# Patient Record
Sex: FEMALE | Race: WHITE | NOT HISPANIC OR LATINO | Employment: OTHER
[De-identification: names, ages, dates, MRNs, and addresses within clinical notes are randomized per-mention and may not be internally consistent; named-entity substitution may affect disease eponyms.]

---

## 2017-07-22 ENCOUNTER — HEALTH MAINTENANCE LETTER (OUTPATIENT)
Age: 32
End: 2017-07-22

## 2022-03-05 ENCOUNTER — HEALTH MAINTENANCE LETTER (OUTPATIENT)
Age: 37
End: 2022-03-05

## 2022-06-21 ENCOUNTER — HOSPITAL ENCOUNTER (EMERGENCY)
Facility: HOSPITAL | Age: 37
Discharge: HOME | End: 2022-06-21
Attending: FAMILY MEDICINE | Admitting: FAMILY MEDICINE
Payer: COMMERCIAL

## 2022-06-21 VITALS
DIASTOLIC BLOOD PRESSURE: 93 MMHG | TEMPERATURE: 98 F | RESPIRATION RATE: 18 BRPM | SYSTOLIC BLOOD PRESSURE: 143 MMHG | HEART RATE: 108 BPM | BODY MASS INDEX: 40.24 KG/M2 | WEIGHT: 220 LBS

## 2022-06-21 DIAGNOSIS — S61.512A LACERATION OF LEFT WRIST, INITIAL ENCOUNTER: ICD-10-CM

## 2022-06-21 PROCEDURE — 99283 EMERGENCY DEPT VISIT LOW MDM: CPT

## 2022-06-21 PROCEDURE — 12001 RPR S/N/AX/GEN/TRNK 2.5CM/<: CPT

## 2022-06-21 NOTE — ED TRIAGE NOTES
Laceration on the left wrist. Pt was using chisel on a stump taking the bark off and chisel slip and had laceration on the left wrist. Last tetanus shots was 2 years ago. No active bleeding noted. Applied dressing.     Triage Assessment     Row Name 06/21/22 0115       Triage Assessment (Adult)    Airway WDL WDL       Respiratory WDL    Respiratory WDL WDL       Cognitive/Neuro/Behavioral WDL    Cognitive/Neuro/Behavioral WDL WDL

## 2022-06-21 NOTE — ED PROVIDER NOTES
EMERGENCY DEPARTMENT ENCOUNTER      NAME: Kaitlin Her  AGE: 36 year old female  YOB: 1985  MRN: [id number]  EVALUATION DATE & TIME: No admission date for patient encounter.    PCP: Sabina Crespo    ED PROVIDER: Avinash Erazo M.D.    Chief Complaint   Patient presents with     Laceration       FINAL IMPRESSION:  1. Laceration of left wrist, initial encounter        ED COURSE & MEDICAL DECISION MAKING:    Pertinent Labs & Imaging studies personally reviewed and interpreted by me. (See chart for details)  1:26 AM Patient seen and examined, prior records reviewed. Laceration repair was performed. Patient tolerated the procedure well.  No evidence for tendon laceration or dysfunction.    At the conclusion of the encounter I discussed the results of all of the tests and the disposition. The questions were answered. The patient or family acknowledged understanding and was agreeable with the care plan.     PROCEDURES:   [facility]    -Laceration Repair    Date/Time: 6/21/2022 1:43 AM  Performed by: Avinash Erazo MD  Authorized by: Avinash Erazo MD     Risks, benefits and alternatives discussed.      ANESTHESIA (see MAR for exact dosages):     Anesthesia method:  Local infiltration    Local anesthetic:  Lidocaine 1% WITH epi  LACERATION DETAILS     Location:  Shoulder/arm    Shoulder/arm location:  L lower arm    Length (cm):  1.2    REPAIR TYPE:     Repair type:  Simple        TREATMENT:     Area cleansed with:  Hibiclens    Amount of cleaning:  Standard    SKIN REPAIR     Repair method:  Sutures    Suture size:  4-0    Suture material:  Chromic gut    Suture technique:  Simple interrupted    Number of sutures:  3    APPROXIMATION     Approximation:  Loose    PROCEDURE    Patient Tolerance:  Patient tolerated the procedure well with no immediate complications      MEDICATIONS GIVEN IN THE EMERGENCY:  Medications - No data to display    NEW PRESCRIPTIONS  STARTED AT TODAY'S ER VISIT  New Prescriptions    No medications on file       =================================================================    HPI    Patient information was obtained from: patient       Kaitlin Her is a healthy 36 year old female who presents to this ED via walk in for evaluation of left wrist laceration.    Patient presents with laceration on her left wrist when she using the chisel on a tree stump trying to take the bark off when it slipped onto her left wrist. Her last Tdap was 2 years ago. No active bleeding but she was concerned with the size of her laceration and came into ED without cleaning it. She does work as a  currently.       REVIEW OF SYSTEMS   Review of Systems   Musculoskeletal:        Positive laceration to left wrist   Skin:        Positive laceration to left wrist   All other systems reviewed and are negative.     All other systems reviewed and negative    PAST MEDICAL HISTORY:  History reviewed. No pertinent past medical history.    PAST SURGICAL HISTORY:  History reviewed. No pertinent surgical history.    CURRENT MEDICATIONS:    No current facility-administered medications for this encounter.     No current outpatient medications on file.       ALLERGIES:  Allergies   Allergen Reactions     No Known Drug Allergies        FAMILY HISTORY:  Family History   Problem Relation Age of Onset     Respiratory Brother         asthma     Breast Cancer Mother              Family History Negative Father         unknown     Diabetes Maternal Grandmother      Hypertension Maternal Grandmother      Heart Disease Maternal Grandmother         stents     Lipids Maternal Grandmother         chol     Family History Negative Sister        SOCIAL HISTORY:   Social History     Socioeconomic History     Marital status:        VITALS:  BP (!) 143/93   Pulse 108   Temp 98  F (36.7  C) (Tympanic)   Resp 18   Wt 99.8 kg (220 lb)   BMI 40.24 kg/m      PHYSICAL  EXAM:  Physical Exam  Vitals and nursing note reviewed.   Constitutional:       Appearance: Normal appearance.   HENT:      Head: Normocephalic and atraumatic.      Right Ear: External ear normal.      Left Ear: External ear normal.      Nose: Nose normal.      Mouth/Throat:      Mouth: Mucous membranes are moist.   Eyes:      Extraocular Movements: Extraocular movements intact.      Conjunctiva/sclera: Conjunctivae normal.      Pupils: Pupils are equal, round, and reactive to light.   Cardiovascular:      Rate and Rhythm: Normal rate and regular rhythm.   Pulmonary:      Effort: Pulmonary effort is normal.      Breath sounds: Normal breath sounds. No wheezing or rales.   Abdominal:      General: Abdomen is flat. There is no distension.      Palpations: Abdomen is soft.      Tenderness: There is no abdominal tenderness. There is no guarding.   Musculoskeletal:         General: Normal range of motion.      Cervical back: Normal range of motion and neck supple.      Right lower leg: No edema.      Left lower leg: No edema.      Comments: 12 mm straight full thickness laceration to radial wrist of left arm. Full flexion and extension of wrist MCP and PIP of joints.    Lymphadenopathy:      Cervical: No cervical adenopathy.   Skin:     General: Skin is warm and dry.   Neurological:      General: No focal deficit present.      Mental Status: She is alert and oriented to person, place, and time. Mental status is at baseline.      Comments: No gross focal neurologic deficits   Psychiatric:         Mood and Affect: Mood normal.         Behavior: Behavior normal.         Thought Content: Thought content normal.        I, Mirian Zambrano, am serving as a scribe to document services personally performed by Dr. Erazo based on my observation and the provider's statements to me. I, Avinash Erazo MD attest that Mirian Zambrano is acting in a scribe capacity, has observed my performance of the services and has documented them in  accordance with my direction.    Avinash Erazo M.D.  Emergency Medicine  [facility] EMERGENCY DEPARTMENT  [facility] [facility] [id number] [phone]  Dept: [phone]     Avinash Erazo MD  06/21/22 8862

## 2022-06-21 NOTE — DISCHARGE INSTRUCTIONS
The sutures are absorbable and will fall out on their own, or you can have them removed by your primary care doctor in 10 to 14 days

## 2022-11-20 ENCOUNTER — HEALTH MAINTENANCE LETTER (OUTPATIENT)
Age: 37
End: 2022-11-20

## 2023-04-15 ENCOUNTER — HEALTH MAINTENANCE LETTER (OUTPATIENT)
Age: 38
End: 2023-04-15

## 2024-02-21 ENCOUNTER — APPOINTMENT (OUTPATIENT)
Dept: ULTRASOUND IMAGING | Facility: HOSPITAL | Age: 39
End: 2024-02-21
Attending: EMERGENCY MEDICINE
Payer: COMMERCIAL

## 2024-02-21 ENCOUNTER — APPOINTMENT (OUTPATIENT)
Dept: CT IMAGING | Facility: HOSPITAL | Age: 39
End: 2024-02-21
Attending: EMERGENCY MEDICINE
Payer: COMMERCIAL

## 2024-02-21 ENCOUNTER — HOSPITAL ENCOUNTER (EMERGENCY)
Facility: HOSPITAL | Age: 39
Discharge: HOME | End: 2024-02-21
Attending: EMERGENCY MEDICINE | Admitting: EMERGENCY MEDICINE
Payer: COMMERCIAL

## 2024-02-21 VITALS
SYSTOLIC BLOOD PRESSURE: 154 MMHG | WEIGHT: 253 LBS | OXYGEN SATURATION: 99 % | RESPIRATION RATE: 16 BRPM | BODY MASS INDEX: 46.56 KG/M2 | DIASTOLIC BLOOD PRESSURE: 79 MMHG | HEART RATE: 92 BPM | TEMPERATURE: 98.4 F | HEIGHT: 62 IN

## 2024-02-21 DIAGNOSIS — N93.9 VAGINAL SPOTTING: ICD-10-CM

## 2024-02-21 DIAGNOSIS — K80.50 BILIARY COLIC SYMPTOM: ICD-10-CM

## 2024-02-21 LAB
ALBUMIN SERPL BCG-MCNC: 3.8 G/DL (ref 3.5–5.2)
ALBUMIN UR-MCNC: NEGATIVE MG/DL
ALP SERPL-CCNC: 84 U/L (ref 40–150)
ALT SERPL W P-5'-P-CCNC: 54 U/L (ref 0–50)
ANION GAP SERPL CALCULATED.3IONS-SCNC: 9 MMOL/L (ref 7–15)
APPEARANCE UR: CLEAR
AST SERPL W P-5'-P-CCNC: 28 U/L (ref 0–45)
BACTERIA #/AREA URNS HPF: ABNORMAL /HPF
BASOPHILS # BLD AUTO: 0 10E3/UL (ref 0–0.2)
BASOPHILS NFR BLD AUTO: 1 %
BILIRUB DIRECT SERPL-MCNC: <0.2 MG/DL (ref 0–0.3)
BILIRUB SERPL-MCNC: <0.2 MG/DL
BILIRUB UR QL STRIP: NEGATIVE
BUN SERPL-MCNC: 15.8 MG/DL (ref 6–20)
CALCIUM SERPL-MCNC: 8.5 MG/DL (ref 8.6–10)
CHLORIDE SERPL-SCNC: 102 MMOL/L (ref 98–107)
COLOR UR AUTO: ABNORMAL
CREAT SERPL-MCNC: 0.88 MG/DL (ref 0.51–0.95)
DEPRECATED HCO3 PLAS-SCNC: 25 MMOL/L (ref 22–29)
EGFRCR SERPLBLD CKD-EPI 2021: 86 ML/MIN/1.73M2
EOSINOPHIL # BLD AUTO: 0.1 10E3/UL (ref 0–0.7)
EOSINOPHIL NFR BLD AUTO: 1 %
ERYTHROCYTE [DISTWIDTH] IN BLOOD BY AUTOMATED COUNT: 12.6 % (ref 10–15)
GLUCOSE SERPL-MCNC: 98 MG/DL (ref 70–99)
GLUCOSE UR STRIP-MCNC: NEGATIVE MG/DL
HCG SERPL QL: NEGATIVE
HCT VFR BLD AUTO: 38.9 % (ref 35–47)
HGB BLD-MCNC: 13.1 G/DL (ref 11.7–15.7)
HGB UR QL STRIP: NEGATIVE
HOLD SPECIMEN: NORMAL
IMM GRANULOCYTES # BLD: 0 10E3/UL
IMM GRANULOCYTES NFR BLD: 0 %
KETONES UR STRIP-MCNC: NEGATIVE MG/DL
LEUKOCYTE ESTERASE UR QL STRIP: NEGATIVE
LIPASE SERPL-CCNC: 31 U/L (ref 13–60)
LYMPHOCYTES # BLD AUTO: 2.5 10E3/UL (ref 0.8–5.3)
LYMPHOCYTES NFR BLD AUTO: 35 %
MCH RBC QN AUTO: 29.2 PG (ref 26.5–33)
MCHC RBC AUTO-ENTMCNC: 33.7 G/DL (ref 31.5–36.5)
MCV RBC AUTO: 87 FL (ref 78–100)
MONOCYTES # BLD AUTO: 0.7 10E3/UL (ref 0–1.3)
MONOCYTES NFR BLD AUTO: 10 %
MUCOUS THREADS #/AREA URNS LPF: PRESENT /LPF
NEUTROPHILS # BLD AUTO: 3.9 10E3/UL (ref 1.6–8.3)
NEUTROPHILS NFR BLD AUTO: 53 %
NITRATE UR QL: NEGATIVE
NRBC # BLD AUTO: 0 10E3/UL
NRBC BLD AUTO-RTO: 0 /100
PH UR STRIP: 6.5 [PH] (ref 5–7)
PLATELET # BLD AUTO: 315 10E3/UL (ref 150–450)
POTASSIUM SERPL-SCNC: 4 MMOL/L (ref 3.4–5.3)
PROT SERPL-MCNC: 6.8 G/DL (ref 6.4–8.3)
RBC # BLD AUTO: 4.48 10E6/UL (ref 3.8–5.2)
RBC URINE: 1 /HPF
SODIUM SERPL-SCNC: 136 MMOL/L (ref 135–145)
SP GR UR STRIP: 1.03 (ref 1–1.03)
SQUAMOUS EPITHELIAL: 5 /HPF
UROBILINOGEN UR STRIP-MCNC: <2 MG/DL
WBC # BLD AUTO: 7.3 10E3/UL (ref 4–11)
WBC URINE: 2 /HPF

## 2024-02-21 PROCEDURE — 250N000011 HC RX IP 250 OP 636: Performed by: EMERGENCY MEDICINE

## 2024-02-21 PROCEDURE — 83690 ASSAY OF LIPASE: CPT | Performed by: EMERGENCY MEDICINE

## 2024-02-21 PROCEDURE — 258N000003 HC RX IP 258 OP 636: Performed by: EMERGENCY MEDICINE

## 2024-02-21 PROCEDURE — 99285 EMERGENCY DEPT VISIT HI MDM: CPT | Mod: 25

## 2024-02-21 PROCEDURE — C9113 INJ PANTOPRAZOLE SODIUM, VIA: HCPCS | Performed by: EMERGENCY MEDICINE

## 2024-02-21 PROCEDURE — 81001 URINALYSIS AUTO W/SCOPE: CPT | Performed by: EMERGENCY MEDICINE

## 2024-02-21 PROCEDURE — 250N000013 HC RX MED GY IP 250 OP 250 PS 637: Performed by: EMERGENCY MEDICINE

## 2024-02-21 PROCEDURE — 80053 COMPREHEN METABOLIC PANEL: CPT | Performed by: EMERGENCY MEDICINE

## 2024-02-21 PROCEDURE — 96374 THER/PROPH/DIAG INJ IV PUSH: CPT | Mod: 59

## 2024-02-21 PROCEDURE — 250N000009 HC RX 250: Performed by: EMERGENCY MEDICINE

## 2024-02-21 PROCEDURE — 85025 COMPLETE CBC W/AUTO DIFF WBC: CPT | Performed by: EMERGENCY MEDICINE

## 2024-02-21 PROCEDURE — 76705 ECHO EXAM OF ABDOMEN: CPT

## 2024-02-21 PROCEDURE — 84703 CHORIONIC GONADOTROPIN ASSAY: CPT | Performed by: EMERGENCY MEDICINE

## 2024-02-21 PROCEDURE — 36415 COLL VENOUS BLD VENIPUNCTURE: CPT | Performed by: EMERGENCY MEDICINE

## 2024-02-21 PROCEDURE — 96361 HYDRATE IV INFUSION ADD-ON: CPT

## 2024-02-21 PROCEDURE — 96375 TX/PRO/DX INJ NEW DRUG ADDON: CPT

## 2024-02-21 PROCEDURE — 74177 CT ABD & PELVIS W/CONTRAST: CPT

## 2024-02-21 RX ORDER — ACETAMINOPHEN 500 MG
500 TABLET ORAL EVERY 4 HOURS PRN
Qty: 60 TABLET | Refills: 0 | Status: SHIPPED | OUTPATIENT
Start: 2024-02-21 | End: 2024-07-18

## 2024-02-21 RX ORDER — IOPAMIDOL 755 MG/ML
90 INJECTION, SOLUTION INTRAVASCULAR ONCE
Status: COMPLETED | OUTPATIENT
Start: 2024-02-21 | End: 2024-02-21

## 2024-02-21 RX ORDER — ONDANSETRON 2 MG/ML
4 INJECTION INTRAMUSCULAR; INTRAVENOUS ONCE
Status: COMPLETED | OUTPATIENT
Start: 2024-02-21 | End: 2024-02-21

## 2024-02-21 RX ORDER — LIDOCAINE HYDROCHLORIDE 20 MG/ML
15 SOLUTION OROPHARYNGEAL ONCE
Status: COMPLETED | OUTPATIENT
Start: 2024-02-21 | End: 2024-02-21

## 2024-02-21 RX ORDER — MAGNESIUM HYDROXIDE/ALUMINUM HYDROXICE/SIMETHICONE 120; 1200; 1200 MG/30ML; MG/30ML; MG/30ML
15 SUSPENSION ORAL ONCE
Status: COMPLETED | OUTPATIENT
Start: 2024-02-21 | End: 2024-02-21

## 2024-02-21 RX ORDER — OXYCODONE HYDROCHLORIDE 5 MG/1
5 TABLET ORAL EVERY 6 HOURS PRN
Qty: 10 TABLET | Refills: 0 | Status: SHIPPED | OUTPATIENT
Start: 2024-02-21 | End: 2024-02-24

## 2024-02-21 RX ADMIN — SODIUM CHLORIDE 1000 ML: 9 INJECTION, SOLUTION INTRAVENOUS at 03:17

## 2024-02-21 RX ADMIN — PANTOPRAZOLE SODIUM 40 MG: 40 INJECTION, POWDER, FOR SOLUTION INTRAVENOUS at 03:14

## 2024-02-21 RX ADMIN — IOPAMIDOL 90 ML: 755 INJECTION, SOLUTION INTRAVENOUS at 04:13

## 2024-02-21 RX ADMIN — ALUMINUM HYDROXIDE, MAGNESIUM HYDROXIDE, AND DIMETHICONE 15 ML: 200; 20; 200 SUSPENSION ORAL at 03:11

## 2024-02-21 RX ADMIN — ONDANSETRON 4 MG: 2 INJECTION INTRAMUSCULAR; INTRAVENOUS at 03:12

## 2024-02-21 RX ADMIN — LIDOCAINE HYDROCHLORIDE 15 ML: 20 SOLUTION ORAL; TOPICAL at 03:11

## 2024-02-21 ASSESSMENT — ENCOUNTER SYMPTOMS
SHORTNESS OF BREATH: 0
DYSURIA: 0
NAUSEA: 1
FEVER: 0
ABDOMINAL PAIN: 1
VOMITING: 0
DIARRHEA: 0
COUGH: 1

## 2024-02-21 ASSESSMENT — ACTIVITIES OF DAILY LIVING (ADL)
ADLS_ACUITY_SCORE: 35
ADLS_ACUITY_SCORE: 35

## 2024-02-21 NOTE — ED TRIAGE NOTES
Reports abd pain for past 3-4 days. Denies V/D reports mild nausea. Also reports vaginal bleeding. States she had her period about 2 weeks ago.      Triage Assessment (Adult)       Row Name 02/21/24 0202          Triage Assessment    Airway WDL WDL        Respiratory WDL    Respiratory WDL WDL        Skin Circulation/Temperature WDL    Skin Circulation/Temperature WDL WDL        Cardiac WDL    Cardiac WDL WDL

## 2024-02-21 NOTE — DISCHARGE INSTRUCTIONS
Your gallbladder has multiple stones in it.  This is likely contributing to your pain.    Call and make an appointment to follow-up in the general surgery clinic for gallbladder pain.    You can use the oxycodone if needed for pain, but I would try this after you have tried Tylenol 500 mg.  If tylenol is not providing adequate pain control then you can use the oxycodone.  I recommend that you try to avoid ibuprofen, aspirin, naproxen type products in preparation for possible surgery next week.    Try to avoid any fatty, greasy, or heavy meals.  Consider even a clear liquid diet for the next few days to help calm your gallbladder down and this may allow you to control your pain enough to have surgery next week with the general surgeon.    Make an appointment to follow-up with primary care or OB/GYN to discuss your vaginal spotting and your changes in your menstrual cycles.    Return to the emergency department if you develop fever, worsening pain, persistent vomiting, or any other concerns.    Thank you for choosing [facility] Emergency Department.  It has been my pleasure caring for you today.     ~Dr. Lori MD

## 2024-02-21 NOTE — ED PROVIDER NOTES
EMERGENCY DEPARTMENT ENCOUNTER      NAME: Kaitlin Her  AGE: 38 year old female  YOB: 1985  MRN: [id number]  EVALUATION DATE & TIME: 2024  2:05 AM    PCP: Sabina Crespo (Inactive)    ED PROVIDER: Natalie Sandoval M.D.        Chief Complaint   Patient presents with    Abdominal Pain         FINAL IMPRESSION:    1. Biliary colic symptom    2. Vaginal spotting        MEDICAL DECISION MAKIN year old female history of obesity and presents emergency department with abdominal pain.  She states it has been pretty diffuse for the past 3 to 4 days but is now more localized in the epigastric region.  She has had some nausea without vomiting or diarrhea.  She also reported some vaginal spotting with her last normal menstrual cycle about 2 weeks ago.  Laboratories all unremarkable.  Imaging by CT scan suggest possible biliary colic.  Ultrasound done which shows multiple stones but no common bile duct or biliary dilatation.  No signs for acute cholecystitis.  After discussion with patient and general surgery the decision has been made for patient to go home and follow-up general surgery as an outpatient for consideration for outpatient surgical management.  Patient feels comfortable with the plan.  She states her pain is controlled at this time and feels comfortable with a prescription for pain medicine for home use.  She understands what to watch for and when to return to the ER and all of her questions have been answered.    She has had some vaginal spotting.  Do not think is contributing to her symptoms at this time.  She denies any concerns for STDs.  I do not think this represents PID, TOA, etc.  Hemodynamically stable.  Hemoglobin stable.  Feel she can follow-up with OB/GYN as an outpatient as needed with regards to her vaginal spotting. I do not think she requires an emergent pelvic ultrasound.      ED COURSE:  2:35 AM  I met with the patient to gather history and perform my  exam. ED course and treatment discussed.    4:47 AM  Dated patient on CT results and plan to do ultrasound.  She agrees with that plan.  She states her pain is well-controlled at this time and declines any further pain medication.    6:15 AM  Gallbladder full of gallstones without obvious cholecystitis.  Laboratories reassuring.  She continues to complain of some abdominal pain in this area but it is not worsening.  Is not going away.  She prefers to avoid any pain medication at this time if possible.  Will discuss with general surgery.  Patient states that she will be agreeable to what ever surgery recommends whether it be surgery today or outpatient follow-up.  She last had pulled pork and some soda and apple juice around midnight.    6:22 AM  Spoke with Dr. Gramajo guarding symptomatic biliary colic.  Unfortunate there is no OR time in the near future, possibly 12 hours out from now.  He states the patient is welcome to stay to have surgery at that time if she wishes otherwise he be happy to see her in clinic and arrange outpatient surgical intervention.    6:28 AM  Spoke with the patient.  Explained the risks and benefits of surgery today versus follow-up with general surgery and outpatient management.  She would like to go home which I think is absolutely reasonable.  No signs for acute cholecystitis at this time.  Nothing to suggest ascending cholangitis or other infectious process.  Will send her home with a few pills for pain control, spoke with her about modifying her diet in the short-term, and will make a referral to general surgery.  She states she does not need a work note at this time.  She feels comfortable with this plan.  She understands what to watch for and when to return to the ER and all of her questions have been answered.    I do not think that this represents ACS, PE, ruptured AAA, aortic dissection, bowel obstruction, bowel ischemia, pancreatitis, appendicitis, diverticulitis, kidney stone,  "pyelonephritis, incarcerated or strangulated hernia, ovarian torsion, PID, ectopic pregnancy, tubo-ovarian abscess, viscus perforation, perforated GI ulcer, or other such etiologies at this time.    At the conclusion of the encounter I discussed the results of all of the tests and the disposition. Their questions were answered. The patient (and any family present) acknowledged understanding and were agreeable with the care plan.      CONSULTANTS:  General surgery - Dr. Gramajo        MEDICATIONS GIVEN IN THE EMERGENCY:  Medications   sodium chloride 0.9% BOLUS 1,000 mL ( Intravenous Rate/Dose Verify 2/21/24 0631)   ondansetron (ZOFRAN) injection 4 mg (4 mg Intravenous $Given 2/21/24 0312)   alum & mag hydroxide-simethicone (MAALOX) suspension 15 mL (15 mLs Oral $Given 2/21/24 0311)   lidocaine (viscous) (XYLOCAINE) 2 % solution 15 mL (15 mLs Mouth/Throat $Given 2/21/24 0311)   pantoprazole (PROTONIX) IV push injection 40 mg (40 mg Intravenous $Given 2/21/24 0314)   iopamidol (ISOVUE-370) solution 90 mL (90 mLs Intravenous $Given 2/21/24 3203)           NEW PRESCRIPTIONS STARTED AT TODAY'S ER VISIT     Medication List        Started      acetaminophen 500 MG tablet  Commonly known as: TYLENOL  500 mg, Oral, EVERY 4 HOURS PRN     oxyCODONE 5 MG tablet  Commonly known as: ROXICODONE  5 mg, Oral, EVERY 6 HOURS PRN                  CONDITION:  stable        DISPOSITION:  D.c home         =================================================================  =================================================================  TRIAGE ASSESSMENT:  Reports abd pain for past 3-4 days. Denies V/D reports mild nausea. Also reports vaginal bleeding. States she had her period about 2 weeks ago.     ED Triage Vitals [02/21/24 0201]   Enc Vitals Group      BP (!) 181/92      Pulse 110      Resp 18      Temp 98.4  F (36.9  C)      Temp src Temporal      SpO2 99 %      Weight 114.8 kg (253 lb)      Height 1.575 m (5' 2\")    "       ================================================================  ================================================================    HPI    Patient information was obtained from: patient    Use of Intrepreter: N/A      Kaitlin Her is a 38 year old female with history of obesity who presents to the ER with complaints of abdominal pain.  She has been having this pain for the past 3 to 4 days.  States it is pretty diffuse in nature, but today seems to be more focal in the epigastric region.  She has had some nausea without vomiting or diarrhea.  She had her normal menstrual cycle about 2 weeks ago and does note that she is having more abdominal discomfort in general with her menstrual cycles.  Today she did note some vaginal spotting.  Denies any other vaginal discharge or concerns for STDs.  Denies any suprapubic discomfort.  Otherwise denies fevers, chest pain, shortness of breath.  She has had a little bit of a cough lately but does not feel this is significant.      CHART REVIEW:  Most recent notes in our computer system are from June 2022 and are not pertinent to today's visit.  She does not have any older imaging in our system or care everywhere.      REVIEW OF SYSTEMS  Review of Systems   Constitutional:  Negative for fever.   Respiratory:  Positive for cough. Negative for shortness of breath.    Cardiovascular:  Negative for chest pain.   Gastrointestinal:  Positive for abdominal pain and nausea. Negative for diarrhea and vomiting.   Genitourinary:  Positive for vaginal bleeding. Negative for dysuria, vaginal discharge and vaginal pain.   All other systems reviewed and are negative.        PAST MEDICAL HISTORY:  History reviewed. No pertinent past medical history.      PAST SURGICAL HISTORY:  History reviewed. No pertinent surgical history.      CURRENT MEDICATIONS:    Prior to Admission medications    Not on File         ALLERGIES:  Allergies   Allergen Reactions    No Known Drug Allergy   "        FAMILY HISTORY:  Family History   Problem Relation Age of Onset    Respiratory Brother         asthma    Breast Cancer Mother             Family History Negative Father         unknown    Diabetes Maternal Grandmother     Hypertension Maternal Grandmother     Heart Disease Maternal Grandmother         stents    Lipids Maternal Grandmother         chol    Family History Negative Sister          SOCIAL HISTORY:  Social History     Socioeconomic History    Marital status:          VITALS:  Patient Vitals for the past 24 hrs:   BP Temp Temp src Pulse Resp SpO2 Height Weight   24 0616 (!) 173/95 -- -- 81 16 100 % -- --   24 0600 (!) 167/95 -- -- -- -- -- -- --   24 0545 (!) 155/98 -- -- -- -- -- -- --   24 0345 (!) 155/89 -- -- 80 -- 99 % -- --   24 0330 (!) 148/89 -- -- 79 -- 98 % -- --   24 0315 135/82 -- -- -- -- -- -- --   24 0230 (!) 149/89 -- -- 101 -- 98 % -- --   24 0201 (!) 181/92 98.4  F (36.9  C) Temporal 110 18 99 % 1.575 m (5' 2\") 114.8 kg (253 lb)       Wt Readings from Last 3 Encounters:   24 114.8 kg (253 lb)   22 99.8 kg (220 lb)   20 90.7 kg (200 lb)       Estimated Creatinine Clearance: 105 mL/min (based on SCr of 0.88 mg/dL).    PHYSICAL EXAM    Constitutional:  Well developed, Well nourished, NAD  HENT:  Normocephalic, Atraumatic, Bilateral external ears normal, Nose normal. Neck- Supple, No stridor.   Eyes:  PERRL, EOMI, Conjunctiva normal, No discharge.  Respiratory:  Normal breath sounds, No respiratory distress, No wheezing, Speaks full sentences easily. No cough.   Cardiovascular:  Normal heart rate, Regular rhythm, No rubs, No gallops. Chest wall nontender.   GI:  +obesity.  Bowel sounds normal, Soft, +mild epigastric tenderness, No masses, No flank tenderness. No rebound or guarding.   : deferred  Musculoskeletal: No cyanosis, No clubbing. Good range of motion in all major joints. No major deformities " noted.   Integument:  Warm, Dry, No erythema, No rash.  No petechiae.   Neurologic:  Alert & oriented x 3  Psychiatric:  Affect normal, Cooperative         LAB:  All pertinent labs reviewed and interpreted.  Recent Results (from the past 24 hour(s))   UA with Microscopic reflex to Culture    Collection Time: 02/21/24  2:44 AM    Specimen: Urine, Catheter   Result Value Ref Range    Color Urine Light Yellow Colorless, Straw, Light Yellow, Yellow    Appearance Urine Clear Clear    Glucose Urine Negative Negative mg/dL    Bilirubin Urine Negative Negative    Ketones Urine Negative Negative mg/dL    Specific Gravity Urine 1.030 1.001 - 1.030    Blood Urine Negative Negative    pH Urine 6.5 5.0 - 7.0    Protein Albumin Urine Negative Negative mg/dL    Urobilinogen Urine <2.0 <2.0 mg/dL    Nitrite Urine Negative Negative    Leukocyte Esterase Urine Negative Negative    Bacteria Urine None Seen None Seen /HPF    Mucus Urine Present (A) None Seen /LPF    RBC Urine 1 <=2 /HPF    WBC Urine 2 <=5 /HPF    Squamous Epithelials Urine 5 (H) <=1 /HPF   Basic metabolic panel    Collection Time: 02/21/24  3:07 AM   Result Value Ref Range    Sodium 136 135 - 145 mmol/L    Potassium 4.0 3.4 - 5.3 mmol/L    Chloride 102 98 - 107 mmol/L    Carbon Dioxide (CO2) 25 22 - 29 mmol/L    Anion Gap 9 7 - 15 mmol/L    Urea Nitrogen 15.8 6.0 - 20.0 mg/dL    Creatinine 0.88 0.51 - 0.95 mg/dL    GFR Estimate 86 >60 mL/min/1.73m2    Calcium 8.5 (L) 8.6 - 10.0 mg/dL    Glucose 98 70 - 99 mg/dL   HCG QUALitative pregnancy (blood)    Collection Time: 02/21/24  3:07 AM   Result Value Ref Range    hCG Serum Qualitative Negative Negative   Hepatic function panel    Collection Time: 02/21/24  3:07 AM   Result Value Ref Range    Protein Total 6.8 6.4 - 8.3 g/dL    Albumin 3.8 3.5 - 5.2 g/dL    Bilirubin Total <0.2 <=1.2 mg/dL    Alkaline Phosphatase 84 40 - 150 U/L    AST 28 0 - 45 U/L    ALT 54 (H) 0 - 50 U/L    Bilirubin Direct <0.20 0.00 - 0.30 mg/dL  "  Lipase    Collection Time: 02/21/24  3:07 AM   Result Value Ref Range    Lipase 31 13 - 60 U/L   Extra Blue Top Tube    Collection Time: 02/21/24  3:07 AM   Result Value Ref Range    Hold Specimen JIC    Extra Red Top Tube    Collection Time: 02/21/24  3:07 AM   Result Value Ref Range    Hold Specimen JIC    Extra Green Top (Lithium Heparin) Tube    Collection Time: 02/21/24  3:07 AM   Result Value Ref Range    Hold Specimen JIC    Extra Purple Top Tube    Collection Time: 02/21/24  3:07 AM   Result Value Ref Range    Hold Specimen JIC    Extra Blood Bank Purple Top Tube    Collection Time: 02/21/24  3:07 AM   Result Value Ref Range    Hold Specimen JIC    CBC with platelets and differential    Collection Time: 02/21/24  3:07 AM   Result Value Ref Range    WBC Count 7.3 4.0 - 11.0 10e3/uL    RBC Count 4.48 3.80 - 5.20 10e6/uL    Hemoglobin 13.1 11.7 - 15.7 g/dL    Hematocrit 38.9 35.0 - 47.0 %    MCV 87 78 - 100 fL    MCH 29.2 26.5 - 33.0 pg    MCHC 33.7 31.5 - 36.5 g/dL    RDW 12.6 10.0 - 15.0 %    Platelet Count 315 150 - 450 10e3/uL    % Neutrophils 53 %    % Lymphocytes 35 %    % Monocytes 10 %    % Eosinophils 1 %    % Basophils 1 %    % Immature Granulocytes 0 %    NRBCs per 100 WBC 0 <1 /100    Absolute Neutrophils 3.9 1.6 - 8.3 10e3/uL    Absolute Lymphocytes 2.5 0.8 - 5.3 10e3/uL    Absolute Monocytes 0.7 0.0 - 1.3 10e3/uL    Absolute Eosinophils 0.1 0.0 - 0.7 10e3/uL    Absolute Basophils 0.0 0.0 - 0.2 10e3/uL    Absolute Immature Granulocytes 0.0 <=0.4 10e3/uL    Absolute NRBCs 0.0 10e3/uL       No results found for: \"ABORH\"        RADIOLOGY:  Reviewed all pertinent imaging. Please see official radiology report.    US Abdomen Limited   Final Result   IMPRESSION:   1.  Distended gallbladder with gallstones.            CT Abdomen Pelvis w Contrast   Final Result   IMPRESSION:    1.  Distended gallbladder with gallstones including a stone which appears lodged in the gallbladder neck. Consider ultrasound " for further evaluation.      2.  Right basilar pulmonary nodules measuring up to 8 mm. Recommend dedicated chest CT in follow-up when clinically feasible.            EKG:    none      PROCEDURES:  none    Medical Decision Making  Obtained supplemental history:Supplemental history obtained?: No  Reviewed external records: External records reviewed?: No  Care impacted by chronic illness:N/A  Care significantly affected by social determinants of health:Access to Medical Care  Did you consider but not order tests?: Work up considered but not performed and documented in chart, if applicable  Did you interpret images independently?: Independent interpretation of ECG and images noted in documentation, when applicable.  Consultation discussion with other provider:Did you involve another provider (consultant, , pharmacy, etc.)?: I discussed the care with another health care provider, see documentation for details.  Discharge. I prescribed additional prescription strength medication(s) as charted. I considered admission, but ultimately discharged patient after discussion with general surgery and shared decision with the patient.      Natalie Sandoval M.D. [facility]  Emergency Medicine and Medical Toxicology  Formerly [facility] EMERGENCY DEPARTMENT  [facility] [facility] [id number] [phone]  Dept: [phone]           Natalie Sandoval MD  02/21/24 0651

## 2024-02-26 ENCOUNTER — LAB REQUISITION (OUTPATIENT)
Dept: LAB | Facility: CLINIC | Age: 39
End: 2024-02-26
Payer: COMMERCIAL

## 2024-02-26 DIAGNOSIS — Z13.1 ENCOUNTER FOR SCREENING FOR DIABETES MELLITUS: ICD-10-CM

## 2024-02-26 DIAGNOSIS — Z13.220 ENCOUNTER FOR SCREENING FOR LIPOID DISORDERS: ICD-10-CM

## 2024-02-26 DIAGNOSIS — Z12.4 ENCOUNTER FOR SCREENING FOR MALIGNANT NEOPLASM OF CERVIX: ICD-10-CM

## 2024-02-26 DIAGNOSIS — Z11.3 ENCOUNTER FOR SCREENING FOR INFECTIONS WITH A PREDOMINANTLY SEXUAL MODE OF TRANSMISSION: ICD-10-CM

## 2024-02-26 DIAGNOSIS — N92.6 IRREGULAR MENSTRUATION, UNSPECIFIED: ICD-10-CM

## 2024-02-26 DIAGNOSIS — Z13.0 ENCOUNTER FOR SCREENING FOR DISEASES OF THE BLOOD AND BLOOD-FORMING ORGANS AND CERTAIN DISORDERS INVOLVING THE IMMUNE MECHANISM: ICD-10-CM

## 2024-02-26 PROCEDURE — 87491 CHLMYD TRACH DNA AMP PROBE: CPT | Mod: ORL | Performed by: OBSTETRICS & GYNECOLOGY

## 2024-02-26 PROCEDURE — 83498 ASY HYDROXYPROGESTERONE 17-D: CPT | Mod: ORL | Performed by: OBSTETRICS & GYNECOLOGY

## 2024-02-26 PROCEDURE — 80061 LIPID PANEL: CPT | Mod: ORL | Performed by: OBSTETRICS & GYNECOLOGY

## 2024-02-26 PROCEDURE — 85018 HEMOGLOBIN: CPT | Mod: ORL | Performed by: OBSTETRICS & GYNECOLOGY

## 2024-02-26 PROCEDURE — 83036 HEMOGLOBIN GLYCOSYLATED A1C: CPT | Mod: ORL | Performed by: OBSTETRICS & GYNECOLOGY

## 2024-02-26 PROCEDURE — 84443 ASSAY THYROID STIM HORMONE: CPT | Mod: ORL | Performed by: OBSTETRICS & GYNECOLOGY

## 2024-02-26 PROCEDURE — 84403 ASSAY OF TOTAL TESTOSTERONE: CPT | Mod: ORL | Performed by: OBSTETRICS & GYNECOLOGY

## 2024-02-26 PROCEDURE — G0145 SCR C/V CYTO,THINLAYER,RESCR: HCPCS | Mod: ORL | Performed by: OBSTETRICS & GYNECOLOGY

## 2024-02-26 PROCEDURE — 84146 ASSAY OF PROLACTIN: CPT | Mod: ORL | Performed by: OBSTETRICS & GYNECOLOGY

## 2024-02-26 PROCEDURE — 82627 DEHYDROEPIANDROSTERONE: CPT | Mod: ORL | Performed by: OBSTETRICS & GYNECOLOGY

## 2024-02-26 PROCEDURE — 83001 ASSAY OF GONADOTROPIN (FSH): CPT | Mod: ORL | Performed by: OBSTETRICS & GYNECOLOGY

## 2024-02-27 LAB
C TRACH DNA SPEC QL PROBE+SIG AMP: NEGATIVE
CHOLEST SERPL-MCNC: 189 MG/DL
DHEA-S SERPL-MCNC: 190 UG/DL (ref 35–430)
FASTING STATUS PATIENT QL REPORTED: NO
FSH SERPL IRP2-ACNC: 1.9 MIU/ML
HBA1C MFR BLD: 5.7 %
HDLC SERPL-MCNC: 42 MG/DL
HGB BLD-MCNC: 14.2 G/DL (ref 11.7–15.7)
LDLC SERPL CALC-MCNC: 128 MG/DL
N GONORRHOEA DNA SPEC QL NAA+PROBE: NEGATIVE
NONHDLC SERPL-MCNC: 147 MG/DL
PROLACTIN SERPL 3RD IS-MCNC: 12 NG/ML (ref 5–23)
TRIGL SERPL-MCNC: 95 MG/DL
TSH SERPL DL<=0.005 MIU/L-ACNC: 1.72 UIU/ML (ref 0.3–4.2)

## 2024-02-28 LAB — TESTOST SERPL-MCNC: 20 NG/DL (ref 8–60)

## 2024-02-29 LAB — 17OHP SERPL-MCNC: 23 NG/DL

## 2024-03-01 LAB
BKR LAB AP GYN ADEQUACY: NORMAL
BKR LAB AP GYN INTERPRETATION: NORMAL
BKR LAB AP HPV REFLEX: NORMAL
BKR LAB AP LMP: NORMAL
BKR LAB AP PREVIOUS ABNL DX: NORMAL
BKR LAB AP PREVIOUS ABNORMAL: NORMAL
PATH REPORT.COMMENTS IMP SPEC: NORMAL
PATH REPORT.COMMENTS IMP SPEC: NORMAL
PATH REPORT.RELEVANT HX SPEC: NORMAL

## 2024-06-09 ENCOUNTER — HOSPITAL ENCOUNTER (EMERGENCY)
Facility: HOSPITAL | Age: 39
Discharge: HOME | End: 2024-06-09
Attending: EMERGENCY MEDICINE | Admitting: EMERGENCY MEDICINE
Payer: COMMERCIAL

## 2024-06-09 ENCOUNTER — APPOINTMENT (OUTPATIENT)
Dept: CT IMAGING | Facility: HOSPITAL | Age: 39
End: 2024-06-09
Attending: EMERGENCY MEDICINE
Payer: COMMERCIAL

## 2024-06-09 VITALS
OXYGEN SATURATION: 99 % | TEMPERATURE: 97.9 F | BODY MASS INDEX: 46.06 KG/M2 | WEIGHT: 250.3 LBS | RESPIRATION RATE: 16 BRPM | HEIGHT: 62 IN | DIASTOLIC BLOOD PRESSURE: 87 MMHG | HEART RATE: 96 BPM | SYSTOLIC BLOOD PRESSURE: 149 MMHG

## 2024-06-09 DIAGNOSIS — J32.9 SINUSITIS, UNSPECIFIED CHRONICITY, UNSPECIFIED LOCATION: ICD-10-CM

## 2024-06-09 LAB — HCG UR QL: NEGATIVE

## 2024-06-09 PROCEDURE — 86334 IMMUNOFIX E-PHORESIS SERUM: CPT | Performed by: EMERGENCY MEDICINE

## 2024-06-09 PROCEDURE — 70450 CT HEAD/BRAIN W/O DYE: CPT

## 2024-06-09 PROCEDURE — 99285 EMERGENCY DEPT VISIT HI MDM: CPT | Mod: 25

## 2024-06-09 PROCEDURE — 81025 URINE PREGNANCY TEST: CPT | Performed by: EMERGENCY MEDICINE

## 2024-06-09 PROCEDURE — 86334 IMMUNOFIX E-PHORESIS SERUM: CPT | Mod: 26 | Performed by: PATHOLOGY

## 2024-06-09 PROCEDURE — 70486 CT MAXILLOFACIAL W/O DYE: CPT

## 2024-06-09 PROCEDURE — 84165 PROTEIN E-PHORESIS SERUM: CPT | Mod: 26 | Performed by: PATHOLOGY

## 2024-06-09 ASSESSMENT — ENCOUNTER SYMPTOMS
HEADACHES: 1
SINUS PAIN: 0
RHINORRHEA: 1
SINUS PRESSURE: 0

## 2024-06-09 ASSESSMENT — COLUMBIA-SUICIDE SEVERITY RATING SCALE - C-SSRS
2. HAVE YOU ACTUALLY HAD ANY THOUGHTS OF KILLING YOURSELF IN THE PAST MONTH?: NO
1. IN THE PAST MONTH, HAVE YOU WISHED YOU WERE DEAD OR WISHED YOU COULD GO TO SLEEP AND NOT WAKE UP?: NO
6. HAVE YOU EVER DONE ANYTHING, STARTED TO DO ANYTHING, OR PREPARED TO DO ANYTHING TO END YOUR LIFE?: NO

## 2024-06-09 ASSESSMENT — ACTIVITIES OF DAILY LIVING (ADL)
ADLS_ACUITY_SCORE: 33

## 2024-06-09 NOTE — ED TRIAGE NOTES
Pt. Stated that she has had a runny nose every time she bends over for the last 6mo. Stated that it is clear fluid.  Pt. Stated that she started getting blurred vision about 2wks ago and the last couple days she has been getting headaches in the back of her head. Pt. Denied dizziness, no numbness or tingling. Stated that medications don't help headache.      Triage Assessment (Adult)       Row Name 06/09/24 0506          Triage Assessment    Airway WDL WDL        Respiratory WDL    Respiratory WDL WDL        Skin Circulation/Temperature WDL    Skin Circulation/Temperature WDL WDL        Cardiac WDL    Cardiac WDL WDL        Peripheral/Neurovascular WDL    Peripheral Neurovascular WDL WDL        Cognitive/Neuro/Behavioral WDL    Cognitive/Neuro/Behavioral WDL WDL

## 2024-06-09 NOTE — ED PROVIDER NOTES
NAME: Kaitlin Her  AGE: 38 year old female  YOB: 1985  MRN: [id number]  EVALUATION DATE & TIME: 2024  5:12 AM    PCP: No Ref-Primary, Physician    ED PROVIDER: Evans Quarles M.D.      Chief Complaint   Patient presents with    Eye Problem     FINAL IMPRESSION:  No diagnosis found.    MEDICAL DECISION MAKIN:44 AM I met with the patient, obtained history, performed an initial exam, and discussed options and plan for diagnostics and treatment here in the ED.   7:16 AM patient's CTs and beta transferrin still pending and will be signed out to day physician pending these results and though likely suspect sinusitis and less so CSF leak.    Patient was clinically assessed and consented to treatment. After assessment, medical decision making and workup were discussed with the patient. The patient was agreeable to plan for testing, workup, and treatment.  Pertinent Labs & Imaging studies reviewed. (See chart for details)       Medical Decision Making  Obtained supplemental history:Supplemental history obtained?: Documented in chart  Reviewed external records: External records reviewed?: Documented in chart  Care impacted by chronic illness:N/A  Care significantly affected by social determinants of health:N/A  Did you consider but not order tests?: In addition to work-up documented, I considered the following work up:   Did you interpret images independently?: Independent interpretation of ECG and images noted in documentation, when applicable.  Consultation discussion with other provider:Did you involve another provider (consultant, MH, pharmacy, etc.)?: I discussed the care with another health care provider, see documentation for details.  Admission considered. Patient was signed out to the oncoming physician, disposition pending.    Kaitlin Her is a 38 year old female who presents with nasal drainage and headaches.   Differential diagnosis includes but not limited to sinusitis, CSF  leak, mucosal edema, seasonal allergies.  Patient is a 38-year-old female who presents for nasal drainage.  Patient reports for about 6 months now she has had positional nasal drainage where when she leans forward or puts her head down and she has clear liquid draining from her nose.  She reports initially was more mucus but now it has been clear drainage.  She denies any recent traumas or injuries when this started.  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  As well patient reports an occipital headache the last 2 weeks but no neck pain, no fevers or chills or meningismus appreciated on exam.  Will check beta-2 transferrin and CT scans of the head and sinuses.  Patient CT and patient transferrin still pending awaiting results will be signed out to day physician.    0 minutes of critical care time    MEDICATIONS GIVEN IN THE EMERGENCY:  Medications - No data to display    NEW PRESCRIPTIONS STARTED AT TODAY'S ER VISIT:  New Prescriptions    No medications on file     =================================================================    HPI    Patient information was obtained from: Patient    Use of : N/A , Yes (MARTII  Phone In Person) - Language English    Kaitlin Her is a 38 year old female with a past medical history of congenital nystagmus, who presents nasal drainage or discharge, headache, and blurry vision.  Patient reports about 6 months ago she noticed clear drainage and initially it was not necessarily positional but she reports for a while now it has been mostly positional when she leans forward and will have clear drainage from her nose.  She denies any lightheadedness, no nausea or vomiting, no fainting with this.  She does report occipital headache and intermittently blurred vision since 2 weeks ago.  Patient had spoken to a nurse friend who recommended she  "come to the ER for workup.  She also states some pressure and stuffed feeling in her left ear.    REVIEW OF SYSTEMS   Review of Systems   HENT:  Positive for congestion and rhinorrhea. Negative for sinus pressure and sinus pain.    Eyes:  Positive for visual disturbance.   Neurological:  Positive for headaches.      PAST MEDICAL HISTORY:  No past medical history on file.    PAST SURGICAL HISTORY:  No past surgical history on file.    CURRENT MEDICATIONS:    No current facility-administered medications for this encounter.    Current Outpatient Medications:     acetaminophen (TYLENOL) 500 MG tablet, Take 1 tablet (500 mg) by mouth every 4 hours as needed for pain, Disp: 60 tablet, Rfl: 0    ALLERGIES:  Allergies   Allergen Reactions    No Known Drug Allergy        FAMILY HISTORY:  Family History   Problem Relation Age of Onset    Respiratory Brother         asthma    Breast Cancer Mother             Family History Negative Father         unknown    Diabetes Maternal Grandmother     Hypertension Maternal Grandmother     Heart Disease Maternal Grandmother         stents    Lipids Maternal Grandmother         chol    Family History Negative Sister      SOCIAL HISTORY:   Social History     Socioeconomic History    Marital status:      PHYSICAL EXAM:    Vitals: BP (!) 149/87   Pulse 96   Temp 97.9  F (36.6  C) (Oral)   Resp 16   Ht 1.575 m (5' 2\")   Wt 113.5 kg (250 lb 4.8 oz)   LMP 2024 (Exact Date)   SpO2 99%   BMI 45.78 kg/m     Physical Exam  Vitals and nursing note reviewed.   Constitutional:       General: She is not in acute distress.     Appearance: Normal appearance. She is obese. She is not ill-appearing or toxic-appearing.   HENT:      Head: Normocephalic and atraumatic.      Right Ear: No drainage or tenderness. Tympanic membrane is not injected, perforated or bulging.      Left Ear: No decreased hearing noted. Tenderness present. No drainage or swelling. A middle ear effusion is " present. No foreign body. Tympanic membrane is injected, erythematous and bulging. Tympanic membrane is not perforated.      Nose: Mucosal edema and congestion present. No nasal deformity.      Right Nostril: No epistaxis or occlusion.      Left Nostril: No epistaxis or occlusion.      Right Turbinates: Enlarged.      Left Turbinates: Enlarged.      Right Sinus: No maxillary sinus tenderness or frontal sinus tenderness.      Left Sinus: No maxillary sinus tenderness or frontal sinus tenderness.   Cardiovascular:      Rate and Rhythm: Normal rate and regular rhythm.      Heart sounds: Normal heart sounds.   Pulmonary:      Effort: Pulmonary effort is normal. No respiratory distress.      Breath sounds: Normal breath sounds.   Musculoskeletal:      Cervical back: Normal range of motion and neck supple. No tenderness.   Lymphadenopathy:      Cervical: No cervical adenopathy.   Skin:     General: Skin is warm and dry.      Findings: No erythema.   Neurological:      General: No focal deficit present.      Mental Status: She is alert and oriented to person, place, and time.      Cranial Nerves: No cranial nerve deficit.      Sensory: No sensory deficit.      Coordination: Coordination normal.      Gait: Gait normal.   Psychiatric:         Behavior: Behavior normal.        LAB:  All pertinent labs reviewed and interpreted.  Labs Ordered and Resulted from Time of ED Arrival to Time of ED Departure   HCG QUALITATIVE URINE - Normal       Result Value    hCG Urine Qualitative Negative     BETA 2 TRANSFERRIN       RADIOLOGY:  CT Head w/o Contrast    (Results Pending)   CT Sinus w/o Contrast    (Results Pending)     PROCEDURES:   Procedures     Evans Quarles M.D.  Emergency Medicine  [facility] Emergency Department       Evans Quarles MD  06/09/24 7144

## 2024-06-09 NOTE — ED NOTES
[facility] Discharge Instructions     Discharge disposition:  Discharged to home       Diet:  Regular       Activity Activity as tolerated       Follow-up: Follow up with primary care provider as needed       Additional instructions: Fill prescription with pre-printed paper at any pharmacy

## 2024-06-09 NOTE — ED NOTES
EMERGENCY DEPARTMENT SIGN OUT NOTE        ED COURSE AND MEDICAL DECISION MAKING  Patient was signed out to me by Dr Lefty Quarles at 7:17 AM.    In brief, Kaitlin Her is a 38 year old female who initially presented for evaluation of nasal drainage and headache. Patient reports that for about 6 months, she has had positional nasal drainage which has become more clear recently. 2 weeks ago she developed intermittent blurry vision and occipital headache.      At time of sign out, disposition was pending CT results.  CT does not show any obvious abnormality, including no sinusitis or evidence of malformation or abnormality of the cribriform plate.  Thus as planned by the previous provider will discharge on medication for sinusitis.  Beta-2 transferrin is still pending however with the findings on the CT scan I do not see indication for admission at this time.  Patient was comfortable with this plan.    8:12 AM I introduced myself to the patient and discussed further plan of care.     FINAL IMPRESSION    1. Sinusitis, unspecified chronicity, unspecified location        ED MEDS  Medications - No data to display    LAB  Labs Ordered and Resulted from Time of ED Arrival to Time of ED Departure   HCG QUALITATIVE URINE - Normal       Result Value    hCG Urine Qualitative Negative     BETA 2 TRANSFERRIN           RADIOLOGY    CT Sinus w/o Contrast   Final Result   IMPRESSION:   1.  Clear paranasal sinuses.   2.  No definite dehiscence of the cribriform plate although this is not entirely excluded.            CT Head w/o Contrast   Final Result   IMPRESSION:   1.  No acute intracranial process.                DISCHARGE MEDS  New Prescriptions    AMOXICILLIN-CLAVULANATE (AUGMENTIN) 875-125 MG TABLET    Take 1 tablet by mouth 2 times daily for 7 days       Julito Carballo DO  [facility] EMERGENCY DEPARTMENT  [address] [place] [id number] [phone]         Julito Carballo DO  06/09/24  [id number]

## 2024-06-10 LAB
B2 TRANSFERRIN FLD QL: POSITIVE
B2 TRANSFERRIN INTERPRETATION: ABNORMAL

## 2024-06-16 ENCOUNTER — HEALTH MAINTENANCE LETTER (OUTPATIENT)
Age: 39
End: 2024-06-16

## 2024-07-18 ENCOUNTER — HOSPITAL ENCOUNTER (EMERGENCY)
Facility: HOSPITAL | Age: 39
Discharge: LEFT BEFORE DISCHARGE | End: 2024-07-18
Attending: EMERGENCY MEDICINE | Admitting: EMERGENCY MEDICINE
Payer: COMMERCIAL

## 2024-07-18 ENCOUNTER — OFFICE VISIT (OUTPATIENT)
Dept: FAMILY MEDICINE | Facility: CLINIC | Age: 39
End: 2024-07-18
Payer: COMMERCIAL

## 2024-07-18 VITALS
WEIGHT: 246 LBS | DIASTOLIC BLOOD PRESSURE: 70 MMHG | TEMPERATURE: 98.3 F | BODY MASS INDEX: 45.27 KG/M2 | HEIGHT: 62 IN | RESPIRATION RATE: 20 BRPM | HEART RATE: 100 BPM | SYSTOLIC BLOOD PRESSURE: 130 MMHG | OXYGEN SATURATION: 98 %

## 2024-07-18 VITALS
HEART RATE: 88 BPM | SYSTOLIC BLOOD PRESSURE: 141 MMHG | RESPIRATION RATE: 18 BRPM | OXYGEN SATURATION: 92 % | TEMPERATURE: 98.7 F | DIASTOLIC BLOOD PRESSURE: 88 MMHG

## 2024-07-18 DIAGNOSIS — G96.00 CSF LEAK: Primary | ICD-10-CM

## 2024-07-18 DIAGNOSIS — G96.00 CSF LEAK: ICD-10-CM

## 2024-07-18 LAB
ANION GAP SERPL CALCULATED.3IONS-SCNC: 10 MMOL/L (ref 7–15)
BASOPHILS # BLD AUTO: 0 10E3/UL (ref 0–0.2)
BASOPHILS NFR BLD AUTO: 0 %
BUN SERPL-MCNC: 20.4 MG/DL (ref 6–20)
CALCIUM SERPL-MCNC: 8.9 MG/DL (ref 8.8–10.4)
CHLORIDE SERPL-SCNC: 106 MMOL/L (ref 98–107)
CREAT SERPL-MCNC: 0.79 MG/DL (ref 0.51–0.95)
EGFRCR SERPLBLD CKD-EPI 2021: >90 ML/MIN/1.73M2
EOSINOPHIL # BLD AUTO: 0.2 10E3/UL (ref 0–0.7)
EOSINOPHIL NFR BLD AUTO: 2 %
ERYTHROCYTE [DISTWIDTH] IN BLOOD BY AUTOMATED COUNT: 13.2 % (ref 10–15)
GLUCOSE SERPL-MCNC: 88 MG/DL (ref 70–99)
HCG SERPL QL: NEGATIVE
HCO3 SERPL-SCNC: 26 MMOL/L (ref 22–29)
HCT VFR BLD AUTO: 42 % (ref 35–47)
HGB BLD-MCNC: 13.8 G/DL (ref 11.7–15.7)
IMM GRANULOCYTES # BLD: 0 10E3/UL
IMM GRANULOCYTES NFR BLD: 0 %
LYMPHOCYTES # BLD AUTO: 2 10E3/UL (ref 0.8–5.3)
LYMPHOCYTES NFR BLD AUTO: 21 %
MCH RBC QN AUTO: 28.9 PG (ref 26.5–33)
MCHC RBC AUTO-ENTMCNC: 32.9 G/DL (ref 31.5–36.5)
MCV RBC AUTO: 88 FL (ref 78–100)
MONOCYTES # BLD AUTO: 0.8 10E3/UL (ref 0–1.3)
MONOCYTES NFR BLD AUTO: 8 %
NEUTROPHILS # BLD AUTO: 6.4 10E3/UL (ref 1.6–8.3)
NEUTROPHILS NFR BLD AUTO: 68 %
NRBC # BLD AUTO: 0 10E3/UL
NRBC BLD AUTO-RTO: 0 /100
PLATELET # BLD AUTO: 312 10E3/UL (ref 150–450)
POTASSIUM SERPL-SCNC: 4.3 MMOL/L (ref 3.4–5.3)
RBC # BLD AUTO: 4.77 10E6/UL (ref 3.8–5.2)
SODIUM SERPL-SCNC: 142 MMOL/L (ref 135–145)
WBC # BLD AUTO: 9.3 10E3/UL (ref 4–11)

## 2024-07-18 PROCEDURE — 99283 EMERGENCY DEPT VISIT LOW MDM: CPT

## 2024-07-18 PROCEDURE — 36415 COLL VENOUS BLD VENIPUNCTURE: CPT | Performed by: EMERGENCY MEDICINE

## 2024-07-18 PROCEDURE — 84703 CHORIONIC GONADOTROPIN ASSAY: CPT | Performed by: EMERGENCY MEDICINE

## 2024-07-18 PROCEDURE — 85025 COMPLETE CBC W/AUTO DIFF WBC: CPT | Performed by: EMERGENCY MEDICINE

## 2024-07-18 PROCEDURE — 99205 OFFICE O/P NEW HI 60 MIN: CPT | Performed by: STUDENT IN AN ORGANIZED HEALTH CARE EDUCATION/TRAINING PROGRAM

## 2024-07-18 PROCEDURE — 80048 BASIC METABOLIC PNL TOTAL CA: CPT | Performed by: EMERGENCY MEDICINE

## 2024-07-18 ASSESSMENT — PAIN SCALES - GENERAL: PAINLEVEL: NO PAIN (0)

## 2024-07-18 ASSESSMENT — COLUMBIA-SUICIDE SEVERITY RATING SCALE - C-SSRS
6. HAVE YOU EVER DONE ANYTHING, STARTED TO DO ANYTHING, OR PREPARED TO DO ANYTHING TO END YOUR LIFE?: NO
1. IN THE PAST MONTH, HAVE YOU WISHED YOU WERE DEAD OR WISHED YOU COULD GO TO SLEEP AND NOT WAKE UP?: NO
2. HAVE YOU ACTUALLY HAD ANY THOUGHTS OF KILLING YOURSELF IN THE PAST MONTH?: NO

## 2024-07-18 ASSESSMENT — ACTIVITIES OF DAILY LIVING (ADL): ADLS_ACUITY_SCORE: 33

## 2024-07-18 NOTE — ED NOTES
Expected Patient Referral to ED  1:32 PM    Referring Clinic/Provider:  Amandeep Quispe MD    Reason for referral/Clinical facts:  Recently seen in ER for nasal drip, ?CSF leak (in clinic today they can see that the labs are positive and suggests a CSF leak)  Patient has ongoing headache and blurry vision.  Some of this has been going on now for about 8 months.    Primary physician called neurosurgery who recommends ER evaluation and MRI to see if we can find the site for the defect.    Recommendations provided:  Send to ED for further evaluation    Caller was informed that this institution does possess the capabilities and/or resources to provide for patient and should be transferred to our facility.    Discussed that if direct admit is sought and any hurdles are encountered, this ED would be happy to see the patient and evaluate.    Informed caller that recommendations provided are recommendations based only on the facts provided and that they responsible to accept or reject the advice, or to seek a formal in person consultation as needed and that this ED will see/treat patient should they arrive.      Natalie Sandoval MD  [facility] EMERGENCY DEPARTMENT  [address] [place] [id number]  [phone]       Natalie Sandoval MD  07/18/24 5818

## 2024-07-18 NOTE — PROGRESS NOTES
ER Follow-up Visit      Assessment and Plan   38-year-old female who who presents for ER follow-up.  Patient evaluated there a couple weeks ago for chronic nasal drip.  CT scan of sinuses  could not exclude CSF leak.  Beta-2 transferrin was positive though patient tells me she was not informed and just saw this on MyChart.  Asking about symptoms of CSF leak she states she has been having headaches and continuous leakage over the last 8 months.  More recently over the last 4 weeks she has started having vision changes with blurriness and decreased acuity.  She also tells me she feels an ear fullness though no tinnitus.  She denies symptoms of neck stiffness, fever, chills.  She is responding normally and is not lethargic.  Called and spoke with neurosurgeon on-call for [facility] who recommended emergent evaluation through the ER given patient's symptoms.  He also recommended further imaging.  Discussed with patient and she will go to [facility] ER.  Called and gave signout to ER physician.    1. CSF leak    Options for treatment and follow-up care were reviewed with the patient engaged in the decision making process and verbalized understanding of the options discussed and agreed with the final plan.      Amandeep Arambula MD           [facility]       ER Follow-up Visit:    ER: [place]    Date of Visit: 6/9/24    Reason(s) for Presentation: Headaches    Diagnostic Tests:   CT sinus:  IMPRESSION:  1.  Clear paranasal sinuses.  2.  No definite dehiscence of the cribriform plate although this is not entirely excluded.    Beta 2 transferrin positive    Summary of ER visit copied below/Treatments Recieved:   Kaitlin Her is a 38 year old female who presents with nasal drainage and headaches.   Differential diagnosis includes but not limited to sinusitis, CSF leak, mucosal edema, seasonal allergies.  Patient is a 38-year-old female who presents for nasal drainage.  Patient reports for about 6 months now she has had  "positional nasal drainage where when she leans forward or puts her head down and she has clear liquid draining from her nose.  She reports initially was more mucus but now it has been clear drainage.  She denies any recent traumas or injuries when this started.  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  As well patient reports an occipital headache the last 2 weeks but no neck pain, no fevers or chills or meningismus appreciated on exam.  Will check beta-2 transferrin and CT scans of the head and sinuses.  Patient CT and patient transferrin still pending awaiting results will be signed out to day physician    Concerns today:     Chief Complaint   Patient presents with    [facility] Care     Chronic discharge from nose         She is having headaches occasionally. And some blurry vision. Sometimes has a clogged feeling in her ears.               Review of Systems:     Complete review of systems is negative except as noted in the HPI            Physical Exam:   /70   Pulse 100   Temp 98.3  F (36.8  C)   Resp 20   Ht 1.575 m (5' 2\")   Wt 111.6 kg (246 lb)   LMP 06/03/2024 (Exact Date)   SpO2 98%   BMI 44.99 kg/m      General appearance: Alert, cooperative, no distress, appears stated age  Head: Normocephalic, atraumatic, without obvious abnormality  Eyes: Pupils equal round, reactive.  Conjunctiva clear.  Nose: Nares normal, no drainage.  Throat: Lips, mucosa, tongue normal mucosa pink and moist  Neck: Supple, symmetric, trachea midline, no adenopathy.  No thyroid enlargement, tenderness or nodules.        "

## 2024-07-18 NOTE — ED TRIAGE NOTES
The pt arrives from clinic with a concern for a CSF leak. Has been having nasal drainage, blurry vision and headaches for the past 8 months. Was seen at the clinic and sent to the ER for evaluation by neurology. No new concerns or symptoms at this time.      Triage Assessment (Adult)       Row Name 07/18/24 1451          Triage Assessment    Airway WDL WDL        Cognitive/Neuro/Behavioral WDL    Cognitive/Neuro/Behavioral WDL WDL
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
## 2025-06-21 ENCOUNTER — HEALTH MAINTENANCE LETTER (OUTPATIENT)
Age: 40
End: 2025-06-21